# Patient Record
Sex: MALE | Race: WHITE | NOT HISPANIC OR LATINO | Employment: FULL TIME | URBAN - METROPOLITAN AREA
[De-identification: names, ages, dates, MRNs, and addresses within clinical notes are randomized per-mention and may not be internally consistent; named-entity substitution may affect disease eponyms.]

---

## 2023-05-22 ENCOUNTER — OFFICE VISIT (OUTPATIENT)
Dept: PRIMARY CARE | Facility: CLINIC | Age: 53
End: 2023-05-22
Payer: COMMERCIAL

## 2023-05-22 VITALS
WEIGHT: 132 LBS | HEART RATE: 80 BPM | SYSTOLIC BLOOD PRESSURE: 108 MMHG | HEIGHT: 64 IN | OXYGEN SATURATION: 95 % | TEMPERATURE: 97.4 F | DIASTOLIC BLOOD PRESSURE: 60 MMHG | BODY MASS INDEX: 22.53 KG/M2

## 2023-05-22 DIAGNOSIS — J30.9 ALLERGIC RHINITIS, UNSPECIFIED SEASONALITY, UNSPECIFIED TRIGGER: ICD-10-CM

## 2023-05-22 DIAGNOSIS — R49.0 CHRONIC HOARSENESS: Primary | ICD-10-CM

## 2023-05-22 DIAGNOSIS — R07.9 RIGHT-SIDED CHEST PAIN: ICD-10-CM

## 2023-05-22 DIAGNOSIS — M79.645 FINGER PAIN, LEFT: ICD-10-CM

## 2023-05-22 PROBLEM — R07.89 CHEST PAIN, ATYPICAL: Status: ACTIVE | Noted: 2023-05-22

## 2023-05-22 PROBLEM — D64.9 ANEMIA, MILD: Status: ACTIVE | Noted: 2023-05-22

## 2023-05-22 PROBLEM — M19.90 ARTHRITIS: Status: ACTIVE | Noted: 2023-05-22

## 2023-05-22 PROCEDURE — 1036F TOBACCO NON-USER: CPT | Performed by: FAMILY MEDICINE

## 2023-05-22 PROCEDURE — 99214 OFFICE O/P EST MOD 30 MIN: CPT | Performed by: FAMILY MEDICINE

## 2023-05-22 RX ORDER — LORATADINE 10 MG/1
1 TABLET ORAL DAILY
COMMUNITY
Start: 2020-02-06

## 2023-05-22 RX ORDER — ALPRAZOLAM 0.5 MG/1
TABLET ORAL
COMMUNITY
Start: 2022-09-07

## 2023-05-22 ASSESSMENT — PATIENT HEALTH QUESTIONNAIRE - PHQ9
10. IF YOU CHECKED OFF ANY PROBLEMS, HOW DIFFICULT HAVE THESE PROBLEMS MADE IT FOR YOU TO DO YOUR WORK, TAKE CARE OF THINGS AT HOME, OR GET ALONG WITH OTHER PEOPLE: SOMEWHAT DIFFICULT
1. LITTLE INTEREST OR PLEASURE IN DOING THINGS: NOT AT ALL
SUM OF ALL RESPONSES TO PHQ9 QUESTIONS 1 AND 2: 1
2. FEELING DOWN, DEPRESSED OR HOPELESS: SEVERAL DAYS

## 2023-05-22 NOTE — PROGRESS NOTES
Subjective   Patient ID: abiola Maldonado is a 52 y.o. male who presents for Follow-up (Left pinky finger feels broke and swollen/Voice is hoarse/Tension in chest right shoulder. /Ua that he had done? /Malcom Bazzi/).  HPI  Left pinky finger feels like collapses. It swells and gets purple.     Hoarsness of the voice- has had a sensitive spot in his throat.  In 2019 he would start to get hoarse voice when singing and feels like has worsened.      Having tension in the right upper chest and shoulder. He has had a lot and wears a back pack.  Dr Solano helped it to go away. No recreational.    He does not exercise a lot.  No stretches for the upper  body.      Review of Systems    Objective   Physical Exam  General: Patient is alert and oriented ×3 and appears in no acute distress. No respiratory distress.    Head: Atraumatic normocephalic.    Eyes: EOMI, PERRLA      Ears: Canals patent without any irritation, tympanic membranes without inflammation, no swelling, normal light reflex.    Nose: Clear discharge present.  Turbinates swollen    Mouth: Normal mucosa. Moist. No erythema, exudates, tonsillar enlargement.  Posterior pharynx drainage    Neck: Normal range of motion, no masses.  Thyroid is palpable and normal in size without any nodules. No anterior cervical or posterior cervical adenopathy.    Heart: Regular rate and rhythm, no murmurs clicks or gallops    Lungs: Clear to auscultation bilaterally without any rhonchi rales or wheezing, lung sounds heard throughout all lung fields    Musculoskeletal: Normal range of motion, strength is grossly intact in the proximal distal muscles of the upper and lower extremities bilaterally, deep tendon reflexes +2 out of 4 and symmetric bilaterally at the patella, Achilles, biceps, triceps, sensation intact.    Nerves: Cranial nerves II through XII appear grossly intact and without deficit    Skin: Intact, dry, no rashes or erythema    Psych: Normal affect.  Assessment/Plan    Problem List Items Addressed This Visit       Allergic rhinitis due to allergen    Relevant Orders    Referral to ENT    Finger pain, left    Relevant Orders    XR fingers left 2+ views     Other Visit Diagnoses       Chronic hoarseness    -  Primary    Relevant Orders    Referral to ENT    Right-sided chest pain        Relevant Orders    Lipoprotein NMR    Comprehensive metabolic panel    CBC and Auto Differential    XR chest 2 views

## 2023-06-19 ENCOUNTER — TELEPHONE (OUTPATIENT)
Dept: PRIMARY CARE | Facility: CLINIC | Age: 53
End: 2023-06-19
Payer: COMMERCIAL

## 2023-07-28 DIAGNOSIS — M25.50 ARTHRALGIA, UNSPECIFIED JOINT: Primary | ICD-10-CM

## 2023-07-31 ENCOUNTER — TELEPHONE (OUTPATIENT)
Dept: PRIMARY CARE | Facility: CLINIC | Age: 53
End: 2023-07-31
Payer: COMMERCIAL

## 2023-07-31 NOTE — TELEPHONE ENCOUNTER
----- Message from Gina Meek CMA sent at 7/31/2023  8:06 AM EDT -----  Regarding: FW: Abnormal xray of the 5th digit left hand    ----- Message -----  From: Jose Carlos Harris DO  Sent: 7/28/2023   1:24 PM EDT  To: Gina Meek CMA  Subject: Abnormal xray of the 5th digit left hand         Ordered labs to evaluate some of the erosive arthritis of the 5th digit.  Possible rheumatoid arthritis  Let him know the results of the xray of the hand

## 2023-07-31 NOTE — TELEPHONE ENCOUNTER
----- Message from Gina Meek CMA sent at 7/31/2023  8:06 AM EDT -----  Regarding: FW: Abnormal xray of the 5th digit left hand    ----- Message -----  From: Jsoe Carlos Harris DO  Sent: 7/28/2023   1:24 PM EDT  To: Gina Meek CMA  Subject: Abnormal xray of the 5th digit left hand         Ordered labs to evaluate some of the erosive arthritis of the 5th digit.  Possible rheumatoid arthritis  Let him know the results of the xray of the hand

## 2023-08-03 ENCOUNTER — TELEPHONE (OUTPATIENT)
Dept: PRIMARY CARE | Facility: CLINIC | Age: 53
End: 2023-08-03
Payer: COMMERCIAL

## 2023-08-03 NOTE — TELEPHONE ENCOUNTER
----- Message from Patricia Bellamy MA sent at 7/31/2023 11:03 AM EDT -----  Regarding: RE: Abnormal xray of the 5th digit left hand  Left msg to call office  ----- Message -----  From: Gina Meek CMA  Sent: 7/31/2023   8:06 AM EDT  To: Patricia Bellamy MA  Subject: FW: Abnormal xray of the 5th digit left hand       ----- Message -----  From: Jose Carlos Harris DO  Sent: 7/28/2023   1:24 PM EDT  To: Gina Meek CMA  Subject: Abnormal xray of the 5th digit left hand         Ordered labs to evaluate some of the erosive arthritis of the 5th digit.  Possible rheumatoid arthritis  Let him know the results of the xray of the hand